# Patient Record
Sex: MALE | Race: WHITE
[De-identification: names, ages, dates, MRNs, and addresses within clinical notes are randomized per-mention and may not be internally consistent; named-entity substitution may affect disease eponyms.]

---

## 2019-08-20 ENCOUNTER — HOSPITAL ENCOUNTER (INPATIENT)
Dept: HOSPITAL 1 - ED | Age: 54
LOS: 4 days | Discharge: LEFT BEFORE DISCHARGE | DRG: 872 | End: 2019-08-24
Attending: FAMILY MEDICINE | Admitting: FAMILY MEDICINE
Payer: SELF-PAY

## 2019-08-20 VITALS
HEIGHT: 72 IN | WEIGHT: 214.5 LBS | BODY MASS INDEX: 29.05 KG/M2 | WEIGHT: 214.5 LBS | HEIGHT: 72 IN | BODY MASS INDEX: 29.05 KG/M2

## 2019-08-20 VITALS — DIASTOLIC BLOOD PRESSURE: 84 MMHG | SYSTOLIC BLOOD PRESSURE: 140 MMHG

## 2019-08-20 DIAGNOSIS — A41.9: Primary | ICD-10-CM

## 2019-08-20 DIAGNOSIS — Z82.49: ICD-10-CM

## 2019-08-20 DIAGNOSIS — Z79.84: ICD-10-CM

## 2019-08-20 DIAGNOSIS — Z53.21: ICD-10-CM

## 2019-08-20 DIAGNOSIS — E11.621: ICD-10-CM

## 2019-08-20 DIAGNOSIS — E83.42: ICD-10-CM

## 2019-08-20 DIAGNOSIS — Z83.3: ICD-10-CM

## 2019-08-20 DIAGNOSIS — Z60.2: ICD-10-CM

## 2019-08-20 DIAGNOSIS — L97.519: ICD-10-CM

## 2019-08-20 DIAGNOSIS — F10.10: ICD-10-CM
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
## 2019-08-20 LAB
ALBUMIN SERPL-MCNC: 3.3 G/DL (ref 3.4–5)
ALP SERPL-CCNC: 97 U/L (ref 46–116)
ALT SERPL-CCNC: 23 U/L (ref 16–63)
AST SERPL-CCNC: 11 U/L (ref 15–37)
BASOPHILS NFR BLD: 2.2 % (ref 0–2)
BILIRUB SERPL-MCNC: 0.6 MG/DL (ref 0.2–1)
BUN SERPL-MCNC: 15 MG/DL (ref 7–18)
CALCIUM SERPL-MCNC: 8.6 MG/DL (ref 8.5–10.1)
CHLORIDE SERPL-SCNC: 101 MMOL/L (ref 98–107)
CO2 SERPL-SCNC: 33.4 MMOL/L (ref 21–32)
CREAT SERPL-MCNC: 0.9 MG/DL (ref 0.7–1.3)
CRP SERPL-MCNC: 5.7 MG/DL (ref ?–0.9)
ERYTHROCYTE [DISTWIDTH] IN BLOOD BY AUTOMATED COUNT: 12.7 % (ref 11.5–14.5)
ERYTHROCYTE [SEDIMENTATION RATE] IN BLOOD BY WESTERGREN METHOD: 60 MM/HR (ref 0–20)
GFR SERPLBLD BASED ON 1.73 SQ M-ARVRAT: > 60 ML/MIN
GLUCOSE SERPL-MCNC: 128 MG/DL (ref 74–106)
MAGNESIUM SERPL-MCNC: 1.7 MG/DL (ref 1.8–2.4)
PHOSPHATE SERPL-MCNC: 2.9 MG/DL (ref 2.5–4.9)
PLATELET # BLD: 259 X10^3MCL (ref 130–400)
POTASSIUM SERPL-SCNC: 3.6 MMOL/L (ref 3.5–5.1)
PROT SERPL-MCNC: 8.1 G/DL (ref 6.4–8.2)
SODIUM SERPL-SCNC: 141 MMOL/L (ref 136–145)

## 2019-08-20 PROCEDURE — G0378 HOSPITAL OBSERVATION PER HR: HCPCS

## 2019-08-20 SDOH — SOCIAL STABILITY - SOCIAL INSECURITY: PROBLEMS RELATED TO LIVING ALONE: Z60.2

## 2019-08-20 NOTE — NUR
PT PRESENTS TO ED C/O "INFECTION" TO R GREAT TOE. PT REPORTS HX OF DM, AND
REPORTS HIS BS HAS BEEN "CONTROLLED". PT AWAITING MSE, NAD NOTED. IN POSITION
OF COMFORT, CALL LIGHT WITHIN REACH.

## 2019-08-20 NOTE — NUR
RECEIVED REPORT FROM EREN MILAN AND ASSUMED CARE OF PATIENT. PT. SITTING ON
GURNEY IN POSITION OF COMFORT. BREATHING E/U. NOT IN ANY APPARENT DISTRESS AT
THIS TIME. CALL LIGHT IN REACH. WILL CONTINUE TO MONITOR

## 2019-08-20 NOTE — NUR
RECEIVED PT FROM PREVIOUS SHIFT. PT A/OX4. DENIES PAIN. DNEIES SOB ON RA. IV
PATENT AND FLUSHING WELL. WOUND TO R FOOT, GOLD. PT ORIENTED TO ROOM AND
SURROUNDINGS. CALL LIGHT WITHIN REACH, BED IN LOW POSTIION. WILL CONTINUE TO
MONITOR.

## 2019-08-20 NOTE — NUR
PT. TRANSFERED TO [facility] VIA WHEELCHAIR BY LOPEZ MCCORMACK. PT. AAOX4, TALKING
AND RESPONDING APPROPRIATELY, BREATHING E/U. NAD. STABLE AT TIME OF TRANSFER.

## 2019-08-20 NOTE — NUR
PT. LAYING ON GURNEY IN POSITION OF COMFORT. BREATHING E/U. DENIES PAIN AT THIS
TIME. CALL LIGHT IN REACH. WILL CONTINUE TO MONITOR

## 2019-08-20 NOTE — NUR
received from er, transported via wheelchair. awake and alert, oriented to
name, place, time and situation. speech clear and appropriate. breathing even
and unlabored on room air, lung sounds clear. sought admission due to 2x5cm
open wound to plantar right big toe. hx of diabetes. admission history and
assessment done. instructed on use of call light to call for assistance,
placed within easy reach. endorsed to nurse fontenot

## 2019-08-21 VITALS — DIASTOLIC BLOOD PRESSURE: 79 MMHG | SYSTOLIC BLOOD PRESSURE: 135 MMHG

## 2019-08-21 VITALS — DIASTOLIC BLOOD PRESSURE: 65 MMHG | SYSTOLIC BLOOD PRESSURE: 123 MMHG

## 2019-08-21 VITALS — DIASTOLIC BLOOD PRESSURE: 64 MMHG | SYSTOLIC BLOOD PRESSURE: 145 MMHG

## 2019-08-21 VITALS — SYSTOLIC BLOOD PRESSURE: 133 MMHG | DIASTOLIC BLOOD PRESSURE: 69 MMHG

## 2019-08-21 LAB
AMPHETAMINES UR QL SCN: (no result)
MICROSCOPIC UR-IMP: NO
RBC # UR STRIP.AUTO: NEGATIVE /UL
UA SPECIFIC GRAVITY: 1.01 (ref 1–1.03)

## 2019-08-21 NOTE — NUR
RECEIVED CALL FROM MICROBIOLOGY THAT PATIENT WOUND CULTURE IS MISLABELLED AND
THEREFORE REJECTED. NEW SPECIMEN OBTATINED HOWEVER, WOUND IS NO LONGER
DRAINING AND IS DRY.

## 2019-08-21 NOTE — NUR
RECEIVED PATIENT FROM JOHN MILAN. PATIENT RESTINGCOMFORTABLY IN BED C/O BEING
HUNGRY AND THIRSTY. ALL OTHER NEEDS MET. IV TO LFA IS PATENT AND INFUSING NS @
100 ML/HR. NO REDNESS OR PAIN. PATIENT ON ROOM AIR. NO C/O SOB AND NO DISTRESS
NOTED. ALL QUESTIONS AND CONCERNS ADDRESSED.

## 2019-08-21 NOTE — NUR
IN TO SEE PATIENT AND ADMINISTER MEDICATION (SEE eMAR). PATIENT RESTING
COMFORTABLY IN BED C/O BEING HUNGRY AND THIRSTY. INFORMED PATIENT THAT WE ARE
WAITING FOR PODIATRY CONSULT. ALL OTHER NEEDS MET.

## 2019-08-21 NOTE — NUR
PODIATRY IN TO SEE PATIENT FOR BEDSIDE DEBRIDEMENT. CONSENT OBTAINED USING
 PHONE. POST DEBRIDEMENT PICTURE TAKEN. PODIATRY OK TO ORDER DIET.
KEONET NOTIFIED AND DAYSI CALLED.

## 2019-08-21 NOTE — NUR
PT RESITNG IN NO ACUTE DISTRESS. RR EVEN AND UNLABORED. CALL LIGHT WITHIN
 REACH, BED IN LOW POSITION. CALL LIGHT WITHIN REACH, BED IN LOW POSITION.
WILL CONTINUE TO MONITOR.

## 2019-08-21 NOTE — NUR
REPORT GIVEN TO KOKO MILAN. PATIENT RESTING COMFORTABLY IN BED WITH ALL NEEDS
MET. IV TO  LFA IS PATENT AND INFUSING NS @ 100 ML/HR. NO REDNESS OR PAIN. ALL
QUESTIONS AND CONCERNS ADDRESSED. ALL CARES ENDORSED.

## 2019-08-21 NOTE — NUR
RECIEVED PATIENT AT START OF SHIFT A/O X4. MED-SURG. NO SOB ON RA. DENIES
PAIN. RLE WOUND DRESSING IN PLACE, CDI. TRACE EDEMA TO RLE, PULSE MODERATE.
POST OP SHOE AT BEDSDIE. IV TO LFA, INFUSING WITHOUT ERYTHEMA OR INFILTRATION.
BEDSIDE TABLE AND CALL LIGHT WITHIN REACH.

## 2019-08-21 NOTE — NUR
PT C/O BEING HUNGRY. INFORMED PATIENT THAT WE ARE STILL WAITING ON PODIATRY
CONSULT, I HAVE NOTIFIED DR PETERS THAT PATIENT IS HUNGRY AND SHE IS
ATTEMPTING TO CALL PODIATRY. WILL KEEP PATIENT UPDATED AND PROVIDE FOOD ASAP.

## 2019-08-22 VITALS — SYSTOLIC BLOOD PRESSURE: 129 MMHG | DIASTOLIC BLOOD PRESSURE: 75 MMHG

## 2019-08-22 VITALS — SYSTOLIC BLOOD PRESSURE: 110 MMHG | DIASTOLIC BLOOD PRESSURE: 74 MMHG

## 2019-08-22 VITALS — DIASTOLIC BLOOD PRESSURE: 72 MMHG | SYSTOLIC BLOOD PRESSURE: 119 MMHG

## 2019-08-22 VITALS — DIASTOLIC BLOOD PRESSURE: 79 MMHG | SYSTOLIC BLOOD PRESSURE: 140 MMHG

## 2019-08-22 LAB
BASOPHILS NFR BLD: 0.5 % (ref 0–2)
BUN SERPL-MCNC: 7 MG/DL (ref 7–18)
CALCIUM SERPL-MCNC: 8.3 MG/DL (ref 8.5–10.1)
CHLORIDE SERPL-SCNC: 103 MMOL/L (ref 98–107)
CO2 SERPL-SCNC: 33.5 MMOL/L (ref 21–32)
CREAT SERPL-MCNC: 0.9 MG/DL (ref 0.7–1.3)
ERYTHROCYTE [DISTWIDTH] IN BLOOD BY AUTOMATED COUNT: 12.9 % (ref 11.5–14.5)
GFR SERPLBLD BASED ON 1.73 SQ M-ARVRAT: > 60 ML/MIN
GLUCOSE SERPL-MCNC: 110 MG/DL (ref 74–106)
MAGNESIUM SERPL-MCNC: 1.9 MG/DL (ref 1.8–2.4)
PHOSPHATE SERPL-MCNC: 4.4 MG/DL (ref 2.5–4.9)
PLATELET # BLD: 242 X10^3MCL (ref 130–400)
POTASSIUM SERPL-SCNC: 3.9 MMOL/L (ref 3.5–5.1)
SODIUM SERPL-SCNC: 142 MMOL/L (ref 136–145)

## 2019-08-22 NOTE — NUR
OLD IV REMOVED WITH CATHETER INTACT. NEW OV STARTED IN LAC 22G. INTACT AND
PATENT WITH NO REDNESS OR INFLAMMATION NOTED.

## 2019-08-22 NOTE — NUR
PATIENT'S EYES ARE CLOSED, BREATHS REGULAR, NO SOB ON RA. CALL LIGHT WITHIN
REACH. IV INFUSING WELL.

## 2019-08-22 NOTE — NUR
RECIEVED PT RESTING IN BED WITH NO C/O PAIN, DISTRESS, OR SOB. A/O X4 WITH NO
HA OR DIZZINESS. NS RUNNING AT 100ML/HR IN LFA, NO REDNESS OR INFLAMMATION
NOTED. SAFETY PRECAUTIONS IN PLACE, CALL LIGHT WITHIN REACH, WILL MONITOR.

## 2019-08-22 NOTE — NUR
PT STABLE AT THIS TIME, NO C/O PAIN, DISTRESS, OR SOB. A/O X4. IV INTACT AND
PATENT TO LAC 22 GUAGE. NO REDNESS OR INFLAMMATION NOTED. NS RUNNING AT
100ML/HR. PT TOLERATED ALL CARES WELL. SAFETY PRECAUTIONS IN PLACE, CALL LIGHT
WITHIN REACH, WILL ENDORSE TO NIGHT NURSE.

## 2019-08-22 NOTE — NUR
PATIENT SLEPT WELL THROUGH THE NIGHT. NO SOB ON RA, NO DISTRESS, NO COMPLAINT
OF PAIN. IV INFUSING WELL. RIGHT FOOT DRESSING CDI. POST OP SHOE AT BEDSDIE.
CALL [facility] AND BEDSDIE TABLE WITHIN REACH. WILL ENDORSE CARE TO DAYSHIFT
NURSE.

## 2019-08-22 NOTE — NUR
RECIEVED PATIENT AT START OF SHIFT A/O X4. DENIES PAIN. NO SOB ON RA. DRESSING
IN PLACE TO RIGHT FOOT APPEARS CDI NO EXUDATE. DAY NURSES STATES PATIENT STILL
HAS NOT HAD DRESSING CHANGE TODAY. WILL CHANGE DRESSING THIS SHIFT. POST OP
SHOE AT BEDSIDE. FAMILY AT BEDSIDE. IV TO LAC INFUSING WELL WITHOUT ERYTHEMA
OR INFILTRATION. WILL CONTINUE TO MONITOR.

## 2019-08-22 NOTE — NUR
RIGHT FOOT WOUND DRESSING REMOVED AT THIS TIME. WOUND ON GREAT BIG TOE APPEARS
YELLOW/GREEN WITH 0.5 CM HOLE IN THE CENTER. NO FOUL ODOR NOTED. WOUND COVERED
WITH BETADINE SOAKED GAUZE, NINA, AND ACE WRAP. LEGS ELEAVTED WITH PILLOW
UNDER THE CALVES, HEELS FLOATING. CALL LIGHT WITHIN REACH.

## 2019-08-22 NOTE — NUR
PT STABLE RESTING IN BED COMFORTABLY. NO C/O PAIN, DISTRESS, OR SOB. SAFETY
PRECAUTIONS IN PLACE, CALL LIGHT WITHIN REACH, WILL MONITOR.

## 2019-08-23 VITALS — DIASTOLIC BLOOD PRESSURE: 76 MMHG | SYSTOLIC BLOOD PRESSURE: 129 MMHG

## 2019-08-23 VITALS — DIASTOLIC BLOOD PRESSURE: 56 MMHG | SYSTOLIC BLOOD PRESSURE: 130 MMHG

## 2019-08-23 VITALS — DIASTOLIC BLOOD PRESSURE: 78 MMHG | SYSTOLIC BLOOD PRESSURE: 126 MMHG

## 2019-08-23 LAB
BASOPHILS NFR BLD: 0.3 % (ref 0–2)
BUN SERPL-MCNC: 9 MG/DL (ref 7–18)
CALCIUM SERPL-MCNC: 8.6 MG/DL (ref 8.5–10.1)
CHLORIDE SERPL-SCNC: 101 MMOL/L (ref 98–107)
CO2 SERPL-SCNC: 32.3 MMOL/L (ref 21–32)
CREAT SERPL-MCNC: 0.8 MG/DL (ref 0.7–1.3)
ERYTHROCYTE [DISTWIDTH] IN BLOOD BY AUTOMATED COUNT: 12.6 % (ref 11.5–14.5)
GFR SERPLBLD BASED ON 1.73 SQ M-ARVRAT: > 60 ML/MIN
GLUCOSE SERPL-MCNC: 123 MG/DL (ref 74–106)
PLATELET # BLD: 249 X10^3MCL (ref 130–400)
POTASSIUM SERPL-SCNC: 3.7 MMOL/L (ref 3.5–5.1)
SODIUM SERPL-SCNC: 143 MMOL/L (ref 136–145)

## 2019-08-23 NOTE — NUR
Recommendation(s):
1. Add George QD for wound healing
2. Spoke w/ NP regarding ONS r/t DM foot ulcer, confirmed.

## 2019-08-23 NOTE — NUR
PT IN NO ACUTE DISTRESS. RESTING IN BED. BREATHING EVEN AND UNLABORED ON RA.
NO C/O PAIN. IVF INFUSING, NO REDNESS OR SWELLING NOTED. CALL LIGHT WITHIN
REACH. WILL CONTINUE TO MONITOR.

## 2019-08-23 NOTE — NUR
RECEIVED PT IN NO ACUTE DISTRESS. RESTING IN BED. AAOX4. RESP EVEN AND
UNLABORED ON RA. DRESSING TO R FOOT C/D/I. NO C/O PAIN. POST-OP SHOE AT
BEDSIDE. IV TO LAC, NO REDNESS OR SWELLING NOTED. BED IN LOW POSITION, CALL
LIGHT WITHIN REACH. WILL CONTINUE TO MONITOR.

## 2019-08-23 NOTE — NUR
DRESSING TO R FOOT CHANGED AS ORDERED. GAUZE SOAKED WITH BETADINE, 4X4, NINA,
AND ACE WRAP APPLIED. PT TOLERATED WELL. WILL ENDORSE TO ONCOMING SHIFT.

## 2019-08-23 NOTE — NUR
PATIENT RECEIVED AWAKE, ALERT, ORIENTED X4. Pakistani SPEAKING. RESPIRATION EVEN
AND UNLABORED, ON ROOM AIR. ONGOING 0.9% NS  CC/HR INFUSING WELL AT THE
RIGHT HAND. LBM 8/22/2019. VOIDING FREELY WITHOUT DIFFICULTY. TRACE EDEMA RLE.
RIGHT TOE ULCER COVERED WITH DRY AND INTACT DRESSING. HEEL WEIGHT BEARING ON R
FOOT WITH POSTOP SHOE AT ALL TIMES. DENIES PAIN AT THIS TIME. WILL CONTINUE TO
MONITOR.

## 2019-08-23 NOTE — NUR
PATIENT SLEPT WELL THROUGH THE NIGHT.  NO SIGNIFICANT EVENTS THIS SHIFT. RIGHT
FOOT DRESSING CDI. DENIES PAIN. IV INFUSING WITHOUT ERYTHEMA OR INFILTRATION.
CALL LIGHT WITHIN REACH. WILL EDNORSE CARE TO DAYSHIFT NURSE.

## 2019-08-23 NOTE — NUR
Initial Nutrition Assessment: (255-B) JULIO CROSS RIA 54M
 
 Dx: R foot DM infection
 PMHx: DM2
 PSHx: None
Labs:  H, Alb 3.3 L, AST 11 L, PTT 30.3 H
 Meds: Colace, Humulin, Zofran
Diet: CCHO
PO intake since admission: ~90%
 Ht: 72in Wt: 214# BMI: 29.1 Bed scale: 213#
IBW: 178# %IBW: 120% UBW: 115kg
 Age: 54
 Food Allergies: NKFA
 Skin: dressing to R foot CDI
Jigar: 21
 Edema: trace RLE
 GI: Last BM: 8/22 RD Note (8/23):
 Visited pt for primary Dx R foot DM infection. Pt Romansh speaking only,
comprehends very little English. Pt resting well, no c/o pain or discomfort at
this time. Pt presented w/ DM foot ulcer w/o OM, pt states controls DM with
diet & exercise, watches what he eats when he notices his BG elevated. Pt
states educated on managing diabetes and importance for his BG and foot
care/neuropathy. Spoke w/ NP Vielka regarding ONS r/t DM foot ulcer,
confirmed.
 
 Problem with N/V/D/C: None
 Problems with: Chewing: no Swallowing: no
 Current appetite: Good
Recent wt change: No %wt change: No
Vitamin/Supplement use: VitC
Special diet at home: DM
Physical activity: N/A
Nutrition education given (specify specific nutrition education and handout
given): None given at this time.
 
 Food-drug interactions? Education given? None given at this time.
 
 Estimated Nutritional Needs Based on current body weight (97kg)
 Energy: 5381-4836 kcal/day (25-30 kcal/kg for maintenance/wound healing)
 Protein: 107-136 g/day (1.1-1.4 g/kg for DM wound healing)
 Fluid: 0052-7900 mL/day (1 mL/kcal)
 
Nutrition Diagnosis:
  Increased nutrient needs r/t DM wound healing AEB R foot DM infection/wound
s/p bedside debridement via Podiatry Consult
 
Intervention
1. Add George QD for wound healing
2. Spoke w/ NP regarding ONS r/t DM foot ulcer, confirmed.
 
Monitor/Evaluate
 Goal: PO intake at least 75% of estimated needs, adequate wound healing
 Monitor: PO intake, wound healing, Labs, GI function
 F/U in 3-5 days as moderate risk 8/26-8/28

## 2019-08-24 VITALS — SYSTOLIC BLOOD PRESSURE: 127 MMHG | DIASTOLIC BLOOD PRESSURE: 75 MMHG

## 2019-08-24 VITALS — SYSTOLIC BLOOD PRESSURE: 107 MMHG | DIASTOLIC BLOOD PRESSURE: 65 MMHG

## 2019-08-24 LAB
BASOPHILS NFR BLD: 0.2 % (ref 0–2)
BUN SERPL-MCNC: 8 MG/DL (ref 7–18)
CALCIUM SERPL-MCNC: 8.4 MG/DL (ref 8.5–10.1)
CHLORIDE SERPL-SCNC: 103 MMOL/L (ref 98–107)
CO2 SERPL-SCNC: 34 MMOL/L (ref 21–32)
CREAT SERPL-MCNC: 0.9 MG/DL (ref 0.7–1.3)
ERYTHROCYTE [DISTWIDTH] IN BLOOD BY AUTOMATED COUNT: 12.9 % (ref 11.5–14.5)
GFR SERPLBLD BASED ON 1.73 SQ M-ARVRAT: > 60 ML/MIN
GLUCOSE SERPL-MCNC: 105 MG/DL (ref 74–106)
PLATELET # BLD: 259 X10^3MCL (ref 130–400)
POTASSIUM SERPL-SCNC: 3.5 MMOL/L (ref 3.5–5.1)
SODIUM SERPL-SCNC: 143 MMOL/L (ref 136–145)

## 2019-08-24 NOTE — NUR
RECEIVED PT IN NO ACUTE DISTRESS. SLEEPING BUT EASILY AROUSABLE. RESP EVEN AND
UNLABORED ON RA. IVF INFUSING, NO REDNESS OR SWELLING TO IV SITE. DRESSING TO
R FOOT C/D/I, RLE ELEVATED WITH PILLOW. BED IN LOW POSITION, CALL LIGHT WITHIN
REACH. WILL CONTINUE TO MONITOR.

## 2019-08-24 NOTE — NUR
PT WANTED TO LEAVE AMA. DR. VERA EXPLAINED RISKS OF LEAVING AMA TO PT. PT
INSISTED ON LEAVING AMA. AMA FORM SIGNED AND PLACED IN CHART. DRESSING TO R
FOOT C/D/I. IV TO R HAND DC'D WITH CATHETER INTACT. BELONGINGS WITH PT.
LEN DIEZ PRESENT FOR TRANSLATION. INSTRUCTED PT TO FOLLOW UP WITH PCP AND
PODIATRIST. ALSO EDUCATED PT TO GO TO THE NEAREST ED IF THERE IS FOUL ODOR,
PUS DRAINAGE, INCREASED REDNESS, WARMTH AND PAIN TO R FOOT WOUND AREA, PT
VERBALIZED UNDERSTANDING. PT WEARING POST-OP SHOE ON R FOOT WHILE AMBULATING.
PT AWAKE, ALERT, AND ORIENTED. NO ACUTE DISTRESS. LEN DIEZ ACCOMPANIED PT TO
LOBBY.

## 2019-08-24 NOTE — NUR
PATIENT RESTING IN BED. RESPIRATION EVEN AND UNLABORED, ON ROOM AIR. IV SITE
NO SIGN OF INFILTRATION. DRESSING TO RIGHT FOOT INTACT. DENIES
DISCOMFORT/PAIN. ASSISTED WITH NEEDS. SAFETY OBSERVED. PLACED BED IN THE
LOWEST POSITION. PLACED CALL LIGHT WITHIN REACH AT ALL TIMES.

## 2019-08-24 NOTE — NUR
PATIENT RESTING IN BED. RESPIRATION EVEN AND UNLABORED, ON ROOM AIR. DENIES
DISCOMFORT/PAIN. IV SITE NO SIGN OF INFILTRATION. ASSISTED WITH NEEDS. WILL
CONTINUE TO MONITOR.